# Patient Record
Sex: FEMALE
[De-identification: names, ages, dates, MRNs, and addresses within clinical notes are randomized per-mention and may not be internally consistent; named-entity substitution may affect disease eponyms.]

---

## 2019-07-05 ENCOUNTER — HOSPITAL ENCOUNTER (EMERGENCY)
Dept: HOSPITAL 11 - JP.ED | Age: 24
LOS: 1 days | Discharge: HOME | End: 2019-07-06
Payer: COMMERCIAL

## 2019-07-05 DIAGNOSIS — Z88.8: ICD-10-CM

## 2019-07-05 DIAGNOSIS — Z88.1: ICD-10-CM

## 2019-07-05 DIAGNOSIS — Z90.49: ICD-10-CM

## 2019-07-05 DIAGNOSIS — Z79.899: ICD-10-CM

## 2019-07-05 DIAGNOSIS — R45.1: Primary | ICD-10-CM

## 2019-07-05 LAB — APAP SERPL-MCNC: < 2 UG/ML (ref 10–30)

## 2019-07-05 PROCEDURE — 36415 COLL VENOUS BLD VENIPUNCTURE: CPT

## 2019-07-05 PROCEDURE — 99284 EMERGENCY DEPT VISIT MOD MDM: CPT

## 2019-07-05 PROCEDURE — 96372 THER/PROPH/DIAG INJ SC/IM: CPT

## 2019-07-05 PROCEDURE — 80305 DRUG TEST PRSMV DIR OPT OBS: CPT

## 2019-07-05 PROCEDURE — 85027 COMPLETE CBC AUTOMATED: CPT

## 2019-07-05 PROCEDURE — 96360 HYDRATION IV INFUSION INIT: CPT

## 2019-07-05 PROCEDURE — 80048 BASIC METABOLIC PNL TOTAL CA: CPT

## 2019-07-05 PROCEDURE — G0480 DRUG TEST DEF 1-7 CLASSES: HCPCS

## 2019-07-05 RX ADMIN — POTASSIUM CHLORIDE ONE MEQ: 1500 TABLET, EXTENDED RELEASE ORAL at 19:45

## 2019-07-05 RX ADMIN — POTASSIUM CHLORIDE ONE: 1500 TABLET, EXTENDED RELEASE ORAL at 20:11

## 2019-07-05 NOTE — EDM.PDOCBH
<LesterJon ISI - Last Filed: 07/06/19 06:22>





ED HPI GENERAL MEDICAL PROBLEM





- General


Chief Complaint: Behavioral/Psych


Stated Complaint: VIA NORTH AM


Time Seen by Provider: 07/05/19 18:00





- Related Data


 Allergies











Allergy/AdvReac Type Severity Reaction Status Date / Time


 


amoxicillin Allergy  Rash Verified 07/05/19 13:51


 


atomoxetine Allergy  Rash Verified 07/05/19 13:51


 


fluoxetine [From Prozac] Allergy  Rash Verified 07/05/19 13:51











Home Meds: 


 Home Meds





Benztropine [Cogentin] 1 tab PO ASDIRECTED 07/05/19 [History]


Prazosin HCl [Prazosin] 1 tab PO ASDIRECTED 07/05/19 [History]


Ranitidine [Zantac] 1 tab PO ASDIRECTED 07/05/19 [History]


hydrOXYzine pamoate [Hydroxyzine Pamoate] 1 tab PO ASDIRECTED 07/05/19 [History]


risperiDONE 1 tab PO ASDIRECTED 07/05/19 [History]











COURSE, BEHAVIORAL HEALTH COMP





- Course


Vital Signs: 


 Last Vital Signs











Temp  36.8 C   07/05/19 09:29


 


Pulse  102 H  07/05/19 09:29


 


Resp  24 H  07/05/19 09:29


 


BP  103/64   07/05/19 09:29


 


Pulse Ox  95   07/05/19 09:29











Orders, Labs, Meds: 


 Laboratory Tests











  07/05/19 07/05/19 07/05/19 Range/Units





  09:41 09:45 09:45 


 


WBC   11.3 H   (4.5-11.0)  K/uL


 


RBC   4.50   (3.30-5.50)  M/uL


 


Hgb   12.9   (12.0-15.0)  g/dL


 


Hct   38.3   (36.0-48.0)  %


 


MCV   85   (80-98)  fL


 


MCH   29   (27-31)  pg


 


MCHC   34   (32-36)  %


 


Plt Count   336   (150-400)  K/uL


 


Sodium    142  (140-148)  mmol/L


 


Potassium    3.2 L  (3.6-5.2)  mmol/L


 


Chloride    105  (100-108)  mmol/L


 


Carbon Dioxide    23  (21-32)  mmol/L


 


Anion Gap    17.2 H  (5.0-14.0)  mmol/L


 


BUN    9  (7-18)  mg/dL


 


Creatinine    1.1 H  (0.6-1.0)  mg/dL


 


Est Cr Clr Drug Dosing    76.03  mL/min


 


Estimated GFR (MDRD)    > 60  (>60)  


 


Glucose    121 H  ()  mg/dL


 


Calcium    8.9  (8.5-10.1)  mg/dL


 


Salicylates     (2.0-20.0)  mg/dL


 


Urine Opiates Screen  Negative    (NEGATIVE)  


 


Ur Oxycodone Screen  Negative    (NEGATIVE)  


 


Urine Methadone Screen  Negative    (NEGATIVE)  


 


Ur Propoxyphene Screen  Negative    (NEGATIVE)  


 


Acetaminophen    < 2.0 L  (10.0-30.0)  ug/mL


 


Ur Barbiturates Screen  Negative    (NEGATIVE)  


 


Ur Tricyclics Screen  Negative    (NEGATIVE)  


 


Ur Phencyclidine Scrn  Negative    (NEGATIVE)  


 


Ur Amphetamine Screen  Presumptive positive H    (NEGATIVE)  


 


U Methamphetamines Scrn  Presumptive positive H    (NEGATIVE)  


 


Urine MDMA Screen  Negative    (NEGATIVE)  


 


U Benzodiazepines Scrn  Negative    (NEGATIVE)  


 


U Cocaine Metab Screen  Negative    (NEGATIVE)  


 


U Marijuana (THC) Screen  Presumptive positive H    (NEGATIVE)  


 


Ethyl Alcohol     mg/dL














  07/05/19 07/05/19 Range/Units





  09:45 09:45 


 


WBC    (4.5-11.0)  K/uL


 


RBC    (3.30-5.50)  M/uL


 


Hgb    (12.0-15.0)  g/dL


 


Hct    (36.0-48.0)  %


 


MCV    (80-98)  fL


 


MCH    (27-31)  pg


 


MCHC    (32-36)  %


 


Plt Count    (150-400)  K/uL


 


Sodium    (140-148)  mmol/L


 


Potassium    (3.6-5.2)  mmol/L


 


Chloride    (100-108)  mmol/L


 


Carbon Dioxide    (21-32)  mmol/L


 


Anion Gap    (5.0-14.0)  mmol/L


 


BUN    (7-18)  mg/dL


 


Creatinine    (0.6-1.0)  mg/dL


 


Est Cr Clr Drug Dosing    mL/min


 


Estimated GFR (MDRD)    (>60)  


 


Glucose    ()  mg/dL


 


Calcium    (8.5-10.1)  mg/dL


 


Salicylates  1.3 L   (2.0-20.0)  mg/dL


 


Urine Opiates Screen    (NEGATIVE)  


 


Ur Oxycodone Screen    (NEGATIVE)  


 


Urine Methadone Screen    (NEGATIVE)  


 


Ur Propoxyphene Screen    (NEGATIVE)  


 


Acetaminophen    (10.0-30.0)  ug/mL


 


Ur Barbiturates Screen    (NEGATIVE)  


 


Ur Tricyclics Screen    (NEGATIVE)  


 


Ur Phencyclidine Scrn    (NEGATIVE)  


 


Ur Amphetamine Screen    (NEGATIVE)  


 


U Methamphetamines Scrn    (NEGATIVE)  


 


Urine MDMA Screen    (NEGATIVE)  


 


U Benzodiazepines Scrn    (NEGATIVE)  


 


U Cocaine Metab Screen    (NEGATIVE)  


 


U Marijuana (THC) Screen    (NEGATIVE)  


 


Ethyl Alcohol   < 3  mg/dL








Medications














Discontinued Medications














Generic Name Dose Route Start Last Admin





  Trade Name Freq  PRN Reason Stop Dose Admin


 


Al Hydroxide/Mg Hydroxide  30 ml  07/06/19 02:48  07/06/19 02:58





  Mag-Al Plus  PO  07/06/19 02:49  30 ml





  ONETIME ONE   Administration





     





     





     





     


 


Diphenhydramine HCl  50 mg  07/05/19 16:05  07/05/19 16:15





  Benadryl  IM  07/05/19 16:06  50 mg





  ONETIME ONE   Administration





     





     





     





     


 


Diphenhydramine HCl  Confirm  07/05/19 16:07  07/05/19 20:14





  Benadryl  Administered  07/05/19 16:08  Not Given





  Dose   





  50 mg   





  .ROUTE   





  .STK-MED ONE   





     





     





     





     


 


Haloperidol Lactate  5 mg  07/05/19 16:06  07/05/19 16:14





  Haldol  IM  07/05/19 16:07  5 mg





  ONETIME ONE   Administration





     





     





     





     


 


Sodium Chloride  1,000 mls @ 999 mls/hr  07/05/19 11:43  07/05/19 13:00





  Normal Saline  IV  07/05/19 12:43  999 mls/hr





  .BOLUS STA   Administration





     





     





     





     


 


Lorazepam  Confirm  07/05/19 09:19  07/05/19 09:40





  Ativan  Administered  07/05/19 09:20  Not Given





  Dose   





  2 mg   





  .ROUTE   





  .STK-MED ONE   





     





     





     





     


 


Lorazepam  2 mg  07/05/19 09:36  07/05/19 09:40





  Ativan  IM  07/05/19 09:37  2 mg





  ONETIME ONE   Administration





     





     





     





     


 


Lorazepam  2 mg  07/05/19 16:04  07/05/19 16:16





  Ativan  IM  07/05/19 16:05  2 mg





  ONETIME ONE   Administration





     





     





     





     


 


Lorazepam  Confirm  07/05/19 16:06  07/05/19 20:14





  Ativan  Administered  07/05/19 16:07  Not Given





  Dose   





  2 mg   





  .ROUTE   





  .STK-MED ONE   





     





     





     





     


 


Potassium Chloride  20 meq  07/05/19 11:42  07/05/19 20:11





  Klor-Con M20  PO  07/05/19 11:43  Not Given





  ONETIME ONE   





     





     





     





     


 


Potassium Chloride  Confirm  07/05/19 19:26  07/05/19 20:11





  Klor-Con M20  Administered  07/05/19 19:27  Not Given





  Dose   





  20 meq   





  .ROUTE   





  .STK-MED ONE   





     





     





     





     


 


Ranitidine HCl  300 mg  07/06/19 02:50  07/06/19 02:58





  Zantac  PO  07/06/19 02:51  300 mg





  ONETIME ONE   Administration





     





     





     





     











Discharge vs Psych Eval/Treatment:: 





07/06/19 06:22


This patient received from [facility] at approx 1800 yesterday.  She had been 

aggitated and had to be put in restraints.  Nurses unable to find placement.  

This morning she is clam and pleasant.  Difficult to understand her speech 

because she seems to have a speech impediment.  She tells a story about some 

kind of adventure with friends and wound up sleeping on an air mattress in a 

stranger's yard where EMS and police found her.  She says she's ready to go 

home now.  Not a risk to herself or others.





Departure





- Departure


Time of Disposition: 06:26


Disposition: Home, Self-Care 01


Clinical Impression: 


 Agitation requiring sedation protocol








- Discharge Information


Instructions:  Caring for Your Mental Health


Referrals: 


PCP,None [Primary Care Provider] - 


Forms:  ED Department Discharge


Additional Instructions: 


continue all your usual medications.  Return to the ER if needed.





<Aquiles Hauser - Last Filed: 08/05/19 18:44>





ED HPI GENERAL MEDICAL PROBLEM





- General


Source of Information: Reports: Patient, EMS


History Limitations: Reports: Combative/Threatening





- History of Present Illness


INITIAL COMMENTS - FREE TEXT/NARRATIVE: 





23 years old female patient brought in by EMS. Initially the patient called and 

complained of shortness of breath then after that start escalating and getting 

agitated. History is limited with the patient agitation and threatening. Stated 

that she is out of her medication for a while.


Onset: Today





Past Medical History


HEENT History: Reports: Other (See Below)


Other HEENT History: patient reports oral surgeries, not specific.


Psychiatric History: Reports: ADD, Anxiety, PTSD, Other (See Below)


Other Psychiatric History: borderline personality disorder





- Past Surgical History


GI Surgical History: Reports: Appendectomy





Social & Family History





- Tobacco Use


Smoking Status *Q: Unknown Ever Smoked





- Recreational Drug Use


Recreational Drug Use: No





ED ROS GENERAL





- Review of Systems


Review Of Systems: Unable To Obtain





ED EXAM, BEHAVIORAL HEALTH





- Physical Exam


Exam: See Below


Exam Limited By: Combative/Threatening


General Appearance: Alert


Neck: Normal Inspection


Respiratory/Chest: No Respiratory Distress


Cardiovascular: Normal Peripheral Pulses, Regular Rate, Rhythm


GI/Abdominal: Normal Bowel Sounds, Soft, Non-Tender


Neurological: Alert


Psychiatric: Alert, Agitated


Skin Exam: Warm, Dry





COURSE, BEHAVIORAL HEALTH COMP





- Course


Re-Assessment/Re-Exam: 





Patient was seen and examined on arrival. She is very agitated and combative. 

There were concern about her being pregnant. She was seen yesterday at [facility] 

and we did prevent her visit and physician reported that she is not pregnant. 

Her quantitative pregnancy test is 5.7 and a cut of his less than 5. I did 

consulted with Dr. Romero [facility] from [place] given her any sedative 

for her acute agitation. He recommended Ativan acutely for now. Patient was 

given 2 mg IM Ativan and she is now calm and cooperative. Lab reviewed with the 

patient.


Medical Clearance: 





07/05/19 18:52


patient was seen and examined shortly after arrival. Patient was very agitated. 

Initially was given 2 mg IM Ativan and she coughs down and cooperated then 

later on escalated again and was given B-52. Patient care transferred to Dr. Ram at time of shift exchange pending disposition into inpatient mental 

health facility or detox facility.





Departure





- Departure


Time of Disposition: 18:53